# Patient Record
Sex: MALE
[De-identification: names, ages, dates, MRNs, and addresses within clinical notes are randomized per-mention and may not be internally consistent; named-entity substitution may affect disease eponyms.]

---

## 2017-06-16 ENCOUNTER — HOSPITAL ENCOUNTER (EMERGENCY)
Dept: HOSPITAL 42 - ED | Age: 57
Discharge: HOME | End: 2017-06-16
Payer: MEDICARE

## 2017-06-16 VITALS
TEMPERATURE: 98.7 F | OXYGEN SATURATION: 97 % | DIASTOLIC BLOOD PRESSURE: 73 MMHG | HEART RATE: 75 BPM | SYSTOLIC BLOOD PRESSURE: 126 MMHG

## 2017-06-16 VITALS — BODY MASS INDEX: 36.8 KG/M2

## 2017-06-16 VITALS — RESPIRATION RATE: 18 BRPM

## 2017-06-16 DIAGNOSIS — R10.9: ICD-10-CM

## 2017-06-16 DIAGNOSIS — N39.0: Primary | ICD-10-CM

## 2017-06-16 LAB
ADD MANUAL DIFF?: NO
ALBUMIN/GLOB SERPL: 1.3 {RATIO} (ref 1.1–1.8)
ALP SERPL-CCNC: 60 U/L (ref 38–133)
ALT SERPL-CCNC: 34 U/L (ref 7–56)
APPEARANCE UR: (no result)
APTT BLD: 28.4 SECONDS (ref 23.7–30.8)
AST SERPL-CCNC: 21 U/L (ref 15–59)
BASE EXCESS BLDV CALC-SCNC: 3.2 MMOL/L (ref 0–2)
BASOPHILS # BLD AUTO: 0.01 K/MM3 (ref 0–2)
BASOPHILS NFR BLD: 0.1 % (ref 0–3)
BILIRUB SERPL-MCNC: 1 MG/DL (ref 0.2–1.3)
BILIRUB UR-MCNC: NEGATIVE MG/DL
BUN SERPL-MCNC: 13 MG/DL (ref 7–21)
CALCIUM SERPL-MCNC: 9.5 MG/DL (ref 8.4–10.5)
CHLORIDE SERPL-SCNC: 101 MMOL/L (ref 98–107)
CO2 SERPL-SCNC: 26 MMOL/L (ref 21–33)
COLOR UR: YELLOW
EOSINOPHIL # BLD: 0.1 10*3/UL (ref 0–0.7)
EOSINOPHIL NFR BLD: 0.8 % (ref 1.5–5)
ERYTHROCYTE [DISTWIDTH] IN BLOOD BY AUTOMATED COUNT: 13.6 % (ref 11.5–14.5)
GLOBULIN SER-MCNC: 3 GM/DL
GLUCOSE SERPL-MCNC: 97 MG/DL (ref 70–110)
GLUCOSE UR STRIP-MCNC: NEGATIVE MG/DL
GRANULOCYTES # BLD: 9.33 10*3/UL (ref 1.4–6.5)
GRANULOCYTES NFR BLD: 80.3 % (ref 50–68)
HCT VFR BLD CALC: 36.9 % (ref 42–52)
INR PPP: 0.99 (ref 0.93–1.08)
KETONES UR STRIP-MCNC: NEGATIVE MG/DL
LEUKOCYTE ESTERASE UR-ACNC: (no result) LEU/UL
LYMPHOCYTES # BLD: 1.4 10*3/UL (ref 1.2–3.4)
LYMPHOCYTES NFR BLD AUTO: 11.9 % (ref 22–35)
MCH RBC QN AUTO: 30.8 PG (ref 25–35)
MCHC RBC AUTO-ENTMCNC: 34.7 G/DL (ref 31–37)
MCV RBC AUTO: 88.7 FL (ref 80–105)
MONOCYTES # BLD AUTO: 0.8 10*3/UL (ref 0.1–0.6)
MONOCYTES NFR BLD: 6.9 % (ref 1–6)
PH BLDV: 7.43 [PH] (ref 7.32–7.43)
PH UR STRIP: 6 [PH] (ref 4.7–8)
PLATELET # BLD: 194 10^3/UL (ref 120–450)
PMV BLD AUTO: 9.9 FL (ref 7–11)
POTASSIUM SERPL-SCNC: 4 MMOL/L (ref 3.6–5)
PROT SERPL-MCNC: 7 G/DL (ref 5.8–8.3)
PROT UR STRIP-MCNC: NEGATIVE MG/DL
RBC # UR STRIP: (no result) /UL
RBC #/AREA URNS HPF: (no result) /HPF (ref 0–2)
SODIUM SERPL-SCNC: 136 MMOL/L (ref 132–148)
SP GR UR STRIP: 1.01 (ref 1–1.03)
TROPONIN I SERPL-MCNC: < 0.01 NG/ML
UROBILINOGEN UR STRIP-ACNC: 0.2 E.U./DL
WBC # BLD AUTO: 11.6 10^3/UL (ref 4.5–11)

## 2017-06-16 PROCEDURE — 99283 EMERGENCY DEPT VISIT LOW MDM: CPT

## 2017-06-16 PROCEDURE — 71010: CPT

## 2017-06-16 PROCEDURE — 82803 BLOOD GASES ANY COMBINATION: CPT

## 2017-06-16 PROCEDURE — 85025 COMPLETE CBC W/AUTO DIFF WBC: CPT

## 2017-06-16 PROCEDURE — 85610 PROTHROMBIN TIME: CPT

## 2017-06-16 PROCEDURE — 82550 ASSAY OF CK (CPK): CPT

## 2017-06-16 PROCEDURE — 83615 LACTATE (LD) (LDH) ENZYME: CPT

## 2017-06-16 PROCEDURE — 80053 COMPREHEN METABOLIC PANEL: CPT

## 2017-06-16 PROCEDURE — 85730 THROMBOPLASTIN TIME PARTIAL: CPT

## 2017-06-16 PROCEDURE — 87086 URINE CULTURE/COLONY COUNT: CPT

## 2017-06-16 PROCEDURE — 93005 ELECTROCARDIOGRAM TRACING: CPT

## 2017-06-16 PROCEDURE — 74176 CT ABD & PELVIS W/O CONTRAST: CPT

## 2017-06-16 PROCEDURE — 81001 URINALYSIS AUTO W/SCOPE: CPT

## 2017-06-16 PROCEDURE — 84484 ASSAY OF TROPONIN QUANT: CPT

## 2017-06-16 NOTE — CT
PROCEDURE:  CT Abdomen and Pelvis without intravenous contrast



HISTORY:

left flank pain



COMPARISON:

None.



TECHNIQUE:

Without oral or IV contrast. 



Contrast Dose: 



Radiation dose:



Total exam DLP = 1127 mGy-cm.



This CT exam was performed using one or more of the following dose 

reduction techniques: Automated exposure control, adjustment of the 

mA and/or kV according to patient size, and/or use of iterative 

reconstruction technique.



FINDINGS:



LOWER THORAX:

Unremarkable. 



LIVER:

Unremarkable. No gross lesion or ductal dilatation.  



GALLBLADDER AND BILE DUCTS:

The gallbladder is contracted and contains a small stone. 



PANCREAS:

Unremarkable. No gross lesion or ductal dilatation.



SPLEEN:

Unremarkable. 



ADRENALS:

Unremarkable. No mass. 



KIDNEYS AND URETERS:

Unremarkable. No hydronephrosis. No solid mass. 



VASCULATURE:

Unremarkable. No aortic aneurysm. 



BOWEL:

Unremarkable. No obstruction. No gross mural thickening. Sutures are 

seen in the ascending colon.



APPENDIX:

Probably resected 



PERITONEUM:

Unremarkable. No free fluid. No free air. 



LYMPH NODES:

Unremarkable. No enlarged lymph nodes. 



BLADDER:

Unremarkable. 



REPRODUCTIVE:

Unremarkable. 



BONES:

No acute fracture. 



OTHER FINDINGS:

None.



IMPRESSION:

No acute intra-abdominal findings

## 2017-06-16 NOTE — RAD
PROCEDURE:  CHEST RADIOGRAPH, 1 VIEW



HISTORY:

chills



COMPARISON:

04/17/2016



FINDINGS:



LUNGS:

Clear.



PLEURA:

No pneumothorax or pleural fluid seen.



CARDIOVASCULAR:

Normal.



OSSEOUS STRUCTURES:

No significant abnormalities.



VISUALIZED UPPER ABDOMEN:

Normal.



OTHER FINDINGS:

None. 



IMPRESSION:

No active disease.

## 2017-06-16 NOTE — ED PDOC
Arrival/HPI





- General


Chief Complaint: Back Pain


Time Seen by Provider: 06/16/17 11:29


Historian: Patient





- History of Present Illness


Narrative History of Present Illness (Text): 





06/16/17 11:45


Patient is a 57 year old male whose past medical history includes gastritis and 

right hemicolectomy, who presents to the emergency department with left sided 

back pain, intermittent body aches, and chills/sweats for the past 3 days. 

Patient states 2 days ago he had some "blood in his underwear" and was 

evaluated by his urologist. He denies associated dysuria, testicular pain or 

back pain at the time. Currently patient denies chest pain or shortness of 

breath. 





Time/Duration: < week


Symptom Onset: Gradual


Symptom Course: Unchanged


Modifying Factors (Text): 


None 





Past Medical History





- Provider Review


Nursing Documentation Reviewed: Yes





- Past History


Past History: No Previous





- Infectious Disease


Hx of Infectious Diseases: None





- Tetanus Immunization


Tetanus Immunization: Unknown





- Past Medical History


Past Medical History: No Previous





- Cardiac


Hx Pacemaker: No





- Pulmonary


Hx Respiratory Disorders: No





- Neurological


HX Cerebrovascular Accident: Yes (200)





- HEENT


Hx HEENT Disorder: Yes


Hx Deafness: Yes





- Renal


Hx Pyelonephritis: Yes





- Endocrine/Metabolic


Hx Endocrine Disorders: No





- Hematological/Oncological


Hx Blood Transfusions: No





- Integumentary


Hx Dermatological Disorder: No





- Musculoskeletal/Rheumatological


Hx Musculoskeletal Disorders: Yes





- Gastrointestinal


Hx Gastrointestinal Disorders: Yes


Other/Comment: rectal bleed





- Genitourinary/Gynecological


Hx Genitourinary Disorders:  (ERECTILE DYSFUNCTION,PRIAPISM)





- Psychiatric


Hx Emotional Abuse: No


Hx Physical Abuse: No


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





- Past Surgical History


Past Surgical History: Non-Contributing





- Surgical History


Other/Comment: right colon 2011/fran cochlear implants





- Anesthesia


Hx Anesthesia Reactions: No


Hx Malignant Hyperthermia: No





- Suicidal Assessment


Feels Threatened In Home Enviroment: No





Family/Social History





- Physician Review


Nursing Documentation Reviewed: Yes


Family/Social History: Unknown Family HX


Smoking Status: vapping


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


Substance used: cocaine


Hx Substance Use Treatment: No





Allergies/Home Meds


Allergies/Adverse Reactions: 


Allergies





warfarin Allergy (Verified 07/14/16 08:46)


 RASH








Home Medications: 


 Home Meds











 Medication  Instructions  Recorded  Confirmed


 


ARIPiprazole [Abilify] 15 mg PO HS 04/17/16 06/16/17


 


Aspirin [Rich Aspirin] 81 mg PO DAILY 04/17/16 06/16/17


 


Clopidogrel [Plavix] 75 mg PO DAILY 04/17/16 06/16/17


 


Gabapentin [Neurontin] 300 mg PO BID 04/17/16 06/16/17


 


Sertraline [Zoloft] 100 mg PO DAILY 04/17/16 06/16/17


 


Trazodone HCl [Trazodone HCl] 300 mg PO HS 04/17/16 06/16/17


 


traMADol [Ultram] 50 mg PO Q4H PRN 04/17/16 06/16/17














Review of Systems





- Physician Review


All systems were reviewed & negative as marked: Yes





- Review of Systems


Constitutional: Other (Chills, Sweats)


Respiratory: absent: SOB


Cardiovascular: absent: Chest Pain


Genitourinary Male: Other (No testicular pain).  absent: Dysuria


Musculoskeletal: Arthralgias, Back Pain





Physical Exam





- Physical Exam


Narrative Physical Exam (Text): 





Head:  Atraumatic.  Normocephalic. 


Eyes:  PERRL.  EOMI.  Conjunctivae are not pale.


ENT:  Mucous membranes are moist and intact.  Oropharynx is clear and 

symmetric. 


Neck:  Supple.  Full ROM.  No JVD.  No lymphadenopathy.


Cardiovascular:  Regular rate.  Regular rhythm.  No murmurs, rubs, or gallops.  

Distal pulses are 2+ and symmetric.


Pulmonary/Chest:  No evidence of respiratory distress.  Clear to auscultation 

bilaterally.  No wheezing, rales or rhonchi.


Abdominal:  Soft and non-distended.  Diffuse but very mild lower abdominal 

tenderness.  No rebound, guarding, or rigidity.  No organomegaly.  Good bowel 

sounds. No incarcerated hernias.


Back:  No CVA tenderness.  No midline tenderness. Point tenderness to left 

lateral lower lumbar region without erythema or edema.


Genitourinary: no testicular edema or erythema, no penile bleeding


Extremities:  No edema.   No cyanosis.  No clubbing.  Full range of motion in 

all extremities.  No calf tenderness.


Skin:  Skin is warm and dry.  No petechiae.  No purpura. 


Neurological:  Alert, awake, and oriented to person, place, time, and 

situation.  Normal speech. Motor and sensory exam intact.


Psychiatric:  Good eye contact.  Normal interaction, affect, and behavior.


06/17/17 12:04





Vital Signs Reviewed: Yes


Vital Signs











  Temp Pulse Resp BP Pulse Ox


 


 06/16/17 13:35  98.7 F  75  18  126/73  97


 


 06/16/17 11:16  99 F  80  18  120/75  95











Temperature: Afebrile


Blood Pressure: Normal


Pulse: Regular


Respiratory Rate: Normal


Appearance: Positive for: Well-Appearing, Non-Toxic, Comfortable


Pain Distress: Mild


Mental Status: Positive for: Alert and Oriented X 3





Medical Decision Making


ED Course and Treatment: 


Differential Diagnosis included but are not limited to:  Renal colic vs 

pyelonephritis vs colitis vs lumbar strain vs. uti





Plan:


Will obtain CT of the Abdomen/Pelvis, labs, urinalysis, and administer IV 

fluids. 





Progress Notes: 


Patient's prior records reviewed.  Recently evaluated by urologist to 2 days ago

, states he did not have pain at that time.


Patient reportedly has had work up for c. diff with PMD, as he has had chronic 

diarrhea. He denies change in character or consistency of stools over the past 

several weeks, still reports "loose stools". Patient also with history of 

chronic abdominal pain by his history. Patient with only mild lower abdominal 

discomfort, no rebound or guarding. CT ordered to evaluate for renal or 

intestinal pathology.





CT Abdomen/Pelvis


Approver: Haile Shelton MD


IMPRESSION:


No acute intra-abdominal findings.





Patient on re-exam is comfortable, nontoxic appearing.


UA reviewed, and I discussed case with Dr. Cross. Given lower abdominal 

discomfort, cannot exclude colitis, after communication with PMD will place on 

antibiotics/Flagyl.





This treatment plan reviewed with patient and PMD, will place on Augmentin as 

there is drug interaction with quinolones on his current meds.


Patient comfortable, nontoxic appearing on re-evaluation.


06/17/17 12:06








- Lab Interpretations


Lab Results: 








 06/16/17 12:01 





 06/16/17 12:01 





 Lab Results





06/16/17 13:05: Urine Color Yellow, Urine Appearance Sl cloudy, Urine pH 6.0, 

Ur Specific Gravity 1.015, Urine Protein Negative, Urine Glucose (UA) Negative, 

Urine Ketones Negative, Urine Blood Trace-intact H, Urine Nitrate Negative, 

Urine Bilirubin Negative, Urine Urobilinogen 0.2, Ur Leukocyte Esterase Small H

, Urine RBC 0 - 2, Urine WBC 5 - 10


06/16/17 12:01: Sodium 136, Chloride 101, Potassium 4.0, Carbon Dioxide 26, 

Anion Gap 13, BUN 13, Creatinine 0.9, Est GFR (African Amer) > 60, Est GFR (Non-

Af Amer) > 60, Random Glucose 97, Calcium 9.5, Total Bilirubin 1.0, AST 21, ALT 

34, Alkaline Phosphatase 60, Lactate Dehydrogenase 280 L, Total Creatine Kinase 

80, Troponin I < 0.01  D, Total Protein 7.0, Albumin 4.0, Globulin 3.0, Albumin/

Globulin Ratio 1.3


06/16/17 12:01: pO2 116 H, VBG pH 7.43, VBG pCO2 42.0, VBG HCO3 27.9, VBG Total 

CO2 29.2 H, VBG O2 Sat (Calc) 99.4 H, VBG Base Excess 3.2 H, VBG Potassium 4.1, 

Sodium 135.0, Chloride 104.0, Glucose 98, Lactate 0.6 L, FiO2 21.0, Venous 

Blood Potassium 4.1


06/16/17 12:01: PT 10.7, INR 0.99, APTT 28.4


06/16/17 12:01: WBC 11.6 H D, RBC 4.16, Hgb 12.8 L, Hct 36.9 L, MCV 88.7, MCH 

30.8, MCHC 34.7, RDW 13.6, Plt Count 194, MPV 9.9, Gran % 80.3 H, Lymph % (Auto

) 11.9 L, Mono % (Auto) 6.9 H, Eos % (Auto) 0.8 L, Baso % (Auto) 0.1, Gran # 

9.33 H, Lymph # 1.4, Mono # 0.8 H, Eos # 0.1, Baso # 0.01











- RAD Interpretation


Radiology Orders: 








06/16/17 11:46


ABD & PELVIS W/O PO OR IV CONT [CT] Stat 


CHEST ONE VIEW [RAD] Stat 














- Medication Orders


Current Medication Orders: 











Discontinued Medications





Sodium Chloride (Sodium Chloride 0.9%)  1,000 mls @ 100 mls/hr IV .Q10H STVEO


   Last Admin: 06/16/17 11:59  Dose: 100 mls/hr











- Nilsibe Statement


The provider has reviewed the documentation as recorded by the Patricia Read





Provider Nilsibwanda Attestation:


All medical record entries made by the Nilsibe were at my direction and 

personally dictated by me. I have reviewed the chart and agree that the record 

accurately reflects my personal performance of the history, physical exam, 

medical decision making, and the department course for this patient. I have 

also personally directed, reviewed, and agree with the discharge instructions 

and disposition. 





Disposition/Present on Arrival





- Present on Arrival


Any Indicators Present on Arrival: No


History of DVT/PE: No


History of Uncontrolled Diabetes: No


Urinary Catheter: No


History of Decub. Ulcer: No


History Surgical Site Infection Following: None





- Disposition


Have Diagnosis and Disposition been Completed?: Yes


Diagnosis: 


 Flank pain, UTI (urinary tract infection), Abdominal pain





Disposition: HOME/ ROUTINE


Disposition Time: 13:30


Patient Plan: Discharge


Condition: GOOD


Discharge Instructions (ExitCare):  Urinary Tract Infection in Men (ED), Flank 

Pain (ED)


Additional Instructions: 


For any fevers, any abdominal pain, any urinary symptoms, any vomiting or 

diarrhea, any sweats, any lightheadedness or dizziness, any difficulty moving 

bowels, any persistent or worsening of any symptoms, get rechecked.





Take antibiotics as directed. If back pain returns or worsening get rechecked 

immediately.


Follow-up with Dr. Jaime in 2-3 days.











Please contact your doctor or call one of the physicians/clinics you have been 

referred to that are listed on the Patient Visit Information form that is 

included in your discharge packet. Bring any paperwork you were given at 

discharge with you along with any medications you are taking to your follow up 

visit. Our treatment cannot replace ongoing medical care by a primary care 

provider (PCP) outside of the emergency department.





Thank you for allowing the [facility] team to be part of your care today.








If you had an X-Ray or CT scan: A Radiologist will review the ED reading if any 

change in treatment is needed we will contact you.***





If you had a blood, urine, or wound culture: It will take several days for the 

results, if any change in treatment is needed we will contact you.***





Prescriptions: 


Amoxicillin/Clavulanate [Augmentin 875 MG-125 MG] 1 tab PO BID #14 tab


metroNIDAZOLE [Flagyl] 500 mg PO TID #21 tab


Referrals: 


Patrick Cross MD [Primary Care Provider] - Follow up with primary

## 2017-06-17 NOTE — CARD
--------------- APPROVED REPORT --------------





EKG Measurement

Heart [id number]

MI [id number]

[id number]

[id number]

[id number]



<Conclusion>

Normal sinus rhythm

Normal ECG

## 2017-07-03 ENCOUNTER — HOSPITAL ENCOUNTER (OUTPATIENT)
Dept: HOSPITAL 42 - OPSURG | Age: 57
Discharge: HOME | End: 2017-07-03
Attending: SURGERY
Payer: MEDICARE

## 2017-07-03 VITALS — BODY MASS INDEX: 36.8 KG/M2

## 2017-07-03 VITALS — SYSTOLIC BLOOD PRESSURE: 127 MMHG | HEART RATE: 75 BPM | DIASTOLIC BLOOD PRESSURE: 72 MMHG | TEMPERATURE: 97.6 F

## 2017-07-03 VITALS — OXYGEN SATURATION: 96 % | RESPIRATION RATE: 18 BRPM

## 2017-07-03 DIAGNOSIS — D23.5: Primary | ICD-10-CM

## 2017-07-03 DIAGNOSIS — L72.3: ICD-10-CM

## 2017-07-03 NOTE — PCM.SURG1
Surgeon's Initial Post Op Note





- Surgeon's Notes


Surgeon: Dr. Sotelo


Assistant: Dr. Coreas PGY2, Dr. Pike PGY3


Type of Anesthesia: Local


Pre-Operative Diagnosis: Sebaceous cyst of abdominal wall


Operative Findings: same


Post-Operative Diagnosis: same


Operation Performed: Excision of Sebaceous Cyst, abdominal wall


Specimen/Specimens Removed: sebaceous cyst


Estimated Blood Loss: EBL {In ML}: 5


Blood Products Given: N/A


Drains Used: No Drains


Post-Op Condition: Good


Date of Surgery/Procedure: 07/03/17


Time of Surgery/Procedure: 10:20

## 2017-07-24 ENCOUNTER — HOSPITAL ENCOUNTER (OUTPATIENT)
Dept: HOSPITAL 42 - ENDO | Age: 57
Discharge: HOME | End: 2017-07-24
Attending: INTERNAL MEDICINE
Payer: MEDICARE

## 2017-07-24 VITALS
TEMPERATURE: 97.6 F | HEART RATE: 57 BPM | RESPIRATION RATE: 16 BRPM | DIASTOLIC BLOOD PRESSURE: 66 MMHG | SYSTOLIC BLOOD PRESSURE: 124 MMHG | OXYGEN SATURATION: 94 %

## 2017-07-24 VITALS — BODY MASS INDEX: 36.8 KG/M2

## 2017-07-24 DIAGNOSIS — K64.8: ICD-10-CM

## 2017-07-24 DIAGNOSIS — I10: ICD-10-CM

## 2017-07-24 DIAGNOSIS — D12.5: ICD-10-CM

## 2017-07-24 DIAGNOSIS — F41.9: ICD-10-CM

## 2017-07-24 DIAGNOSIS — E78.5: ICD-10-CM

## 2017-07-24 DIAGNOSIS — K57.30: ICD-10-CM

## 2017-07-24 DIAGNOSIS — Z86.73: ICD-10-CM

## 2017-07-24 DIAGNOSIS — D12.2: Primary | ICD-10-CM

## 2017-07-24 DIAGNOSIS — K62.5: ICD-10-CM

## 2017-07-24 LAB
BASOPHILS # BLD AUTO: 0.01 K/MM3 (ref 0–2)
BASOPHILS NFR BLD: 0.1 % (ref 0–3)
EOSINOPHIL # BLD: 0.2 10*3/UL (ref 0–0.7)
EOSINOPHIL NFR BLD: 2 % (ref 1.5–5)
ERYTHROCYTE [DISTWIDTH] IN BLOOD BY AUTOMATED COUNT: 13.3 % (ref 11.5–14.5)
GRANULOCYTES # BLD: 5.31 10*3/UL (ref 1.4–6.5)
GRANULOCYTES NFR BLD: 70.4 % (ref 50–68)
HGB BLD-MCNC: 13.9 GM/DL (ref 14–18)
INR PPP: 1.04 (ref 0.93–1.08)
LYMPHOCYTES # BLD: 1.5 10*3/UL (ref 1.2–3.4)
LYMPHOCYTES NFR BLD AUTO: 20.2 % (ref 22–35)
MCH RBC QN AUTO: 30.5 PG (ref 25–35)
MCHC RBC AUTO-ENTMCNC: 35.1 G/DL (ref 31–37)
MCV RBC AUTO: 87 FL (ref 80–105)
MONOCYTES # BLD AUTO: 0.6 10*3/UL (ref 0.1–0.6)
MONOCYTES NFR BLD: 7.3 % (ref 1–6)
PLATELET # BLD: 186 10^3/UL (ref 120–450)
PMV BLD AUTO: 9.3 FL (ref 7–11)
PROTHROMBIN TIME: 11.2 SECONDS (ref 9.9–11.8)
RBC # BLD AUTO: 4.55 10^6/UL (ref 3.5–6.1)
WBC # BLD AUTO: 7.5 10^3/UL (ref 4.5–11)

## 2017-07-24 PROCEDURE — 45385 COLONOSCOPY W/LESION REMOVAL: CPT

## 2017-07-24 PROCEDURE — 88305 TISSUE EXAM BY PATHOLOGIST: CPT

## 2017-07-24 PROCEDURE — 45384 COLONOSCOPY W/LESION REMOVAL: CPT

## 2017-07-24 PROCEDURE — 36415 COLL VENOUS BLD VENIPUNCTURE: CPT

## 2017-07-24 PROCEDURE — 45380 COLONOSCOPY AND BIOPSY: CPT

## 2017-07-24 PROCEDURE — 85610 PROTHROMBIN TIME: CPT

## 2017-07-24 PROCEDURE — 85025 COMPLETE CBC W/AUTO DIFF WBC: CPT

## 2017-08-14 ENCOUNTER — HOSPITAL ENCOUNTER (OUTPATIENT)
Dept: HOSPITAL 42 - ENDO | Age: 57
Discharge: HOME | End: 2017-08-14
Attending: INTERNAL MEDICINE
Payer: MEDICARE

## 2017-08-14 VITALS
TEMPERATURE: 97.6 F | HEART RATE: 67 BPM | SYSTOLIC BLOOD PRESSURE: 141 MMHG | RESPIRATION RATE: 19 BRPM | DIASTOLIC BLOOD PRESSURE: 72 MMHG

## 2017-08-14 VITALS — OXYGEN SATURATION: 96 %

## 2017-08-14 VITALS — BODY MASS INDEX: 39.9 KG/M2

## 2017-08-14 DIAGNOSIS — E78.5: ICD-10-CM

## 2017-08-14 DIAGNOSIS — K21.0: Primary | ICD-10-CM

## 2017-08-14 DIAGNOSIS — K29.80: ICD-10-CM

## 2017-08-14 DIAGNOSIS — K44.9: ICD-10-CM

## 2017-08-14 DIAGNOSIS — K29.50: ICD-10-CM

## 2017-08-14 DIAGNOSIS — I10: ICD-10-CM

## 2017-08-14 DIAGNOSIS — K25.9: ICD-10-CM

## 2017-08-14 PROCEDURE — 43239 EGD BIOPSY SINGLE/MULTIPLE: CPT

## 2017-08-14 PROCEDURE — 88305 TISSUE EXAM BY PATHOLOGIST: CPT

## 2017-08-14 PROCEDURE — 88342 IMHCHEM/IMCYTCHM 1ST ANTB: CPT

## 2017-10-08 ENCOUNTER — HOSPITAL ENCOUNTER (EMERGENCY)
Dept: HOSPITAL 42 - ED | Age: 57
Discharge: HOME | End: 2017-10-08
Payer: MEDICARE

## 2017-10-08 VITALS
HEART RATE: 82 BPM | DIASTOLIC BLOOD PRESSURE: 82 MMHG | SYSTOLIC BLOOD PRESSURE: 130 MMHG | TEMPERATURE: 98.2 F | RESPIRATION RATE: 17 BRPM | OXYGEN SATURATION: 98 %

## 2017-10-08 VITALS — BODY MASS INDEX: 39.9 KG/M2

## 2017-10-08 DIAGNOSIS — M54.5: Primary | ICD-10-CM

## 2017-10-08 PROCEDURE — 99282 EMERGENCY DEPT VISIT SF MDM: CPT

## 2017-10-08 PROCEDURE — 96372 THER/PROPH/DIAG INJ SC/IM: CPT

## 2017-10-08 PROCEDURE — 72110 X-RAY EXAM L-2 SPINE 4/>VWS: CPT

## 2017-10-08 NOTE — RAD
PROCEDURE:  Radiographs of the Lumbar Spine.



HISTORY:

Back Pain. No history of recent/ related trauma provided







COMPARISON:

No prior.



FINDINGS:



BONES:

Normal alignment. No listhesis. No fracture.



DISC SPACES:

Multilevel degenerative changes, mild.  Preservation of 

intervertebral disc spaces.



OTHER FINDINGS:

None.



IMPRESSION:

No significant or acute  findings to account for/ related to the 

clinical presentation. Additional benign and/or incidental findings 

described above.



___________________________________________________________



Concordant results with the preliminary interpretation rendered by 

the emergency department physician

procedure.

## 2017-10-08 NOTE — ED PDOC
Arrival/HPI





- General


Chief Complaint: Back Pain


Time Seen by Provider: 10/08/17 13:03


Historian: Patient





- History of Present Illness


Time/Duration: Other (2 weeks)


Symptom Onset: Gradual


Symptom Course: Worsening


Quality: Aching, Pressure


Severity Level: Moderate


Activities at Onset: Rest


Associated Symptoms (Text): 





10/08/17 13:21


Patient complains of a 2 week history of lower back pain, especially on the 

right, with radiation into his right posterior lower extremity. No injury or 

trauma. No abdominal pain nausea vomiting or diarrhea. No genitourinary 

symptoms. No weakness. No numbness tingling or paresthesias. No history of low 

back pain.





Past Medical History





- Past History


Past History: No Previous





- Infectious Disease


Hx of Infectious Diseases: None





- Tetanus Immunization


Tetanus Immunization: Unknown





- Past Medical History


Past Medical History: No Previous





- Cardiac


Hx Pacemaker: No





- Pulmonary


Hx Respiratory Disorders: No





- Neurological


Hx Paralysis: No





- HEENT


Hx HEENT Disorder: Yes


Hx Deafness: Yes





- Renal


Hx Pyelonephritis: Yes





- Endocrine/Metabolic


Hx Endocrine Disorders: No





- Hematological/Oncological


Hx Blood Transfusions: No


Hx Blood Transfusion Reaction: No





- Integumentary


Hx Dermatological Disorder: No





- Musculoskeletal/Rheumatological


Hx Musculoskeletal Disorders: Yes





- Gastrointestinal


Hx Gastrointestinal Disorders: Yes


Other/Comment: rectal bleed





- Genitourinary/Gynecological


Hx Genitourinary Disorders:  (ERECTILE DYSFUNCTION,PRIAPISM)





- Psychiatric


Hx Emotional Abuse: No


Hx Physical Abuse: No


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





- Past Surgical History


Past Surgical History: Non-Contributing





- Surgical History


Other/Comment: right colon 2011/farn cochlear implants





- Anesthesia


Hx Anesthesia: Yes


Hx Anesthesia Reactions: No


Hx Malignant Hyperthermia: No





- Suicidal Assessment


Feels Threatened In Home Enviroment: No





Family/Social History





- Physician Review


Nursing Documentation Reviewed: Yes


Family/Social History: Unknown Family HX


Smoking Status: Former Smoker


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


Substance used: cocaine


Hx Substance Use Treatment: No





Allergies/Home Meds


Allergies/Adverse Reactions: 


Allergies





warfarin Allergy (Verified 08/07/17 09:58)


 RASH


esomeprazole [From Nexium] Adverse Reaction (Verified 10/08/17 13:11)


 PAIN








Home Medications: 


 Home Meds











 Medication  Instructions  Recorded  Confirmed


 


ARIPiprazole [Abilify] 15 mg PO HS 04/17/16 10/08/17


 


Aspirin [Cocke Aspirin] 81 mg PO DAILY 04/17/16 10/08/17


 


Clopidogrel [Plavix] 75 mg PO DAILY 04/17/16 10/08/17


 


Gabapentin [Neurontin] 300 mg PO BID 04/17/16 10/08/17


 


Sertraline [Zoloft] 100 mg PO DAILY 04/17/16 10/08/17


 


Trazodone HCl 300 mg PO HS 04/17/16 10/08/17


 


traMADol [Ultram] 50 mg PO Q4H PRN 04/17/16 10/08/17


 


Ergocalciferol (Vitamin D2) 2,000 iu PO QPM 07/18/17 10/08/17





[Vitamin D2]   


 


Pravastatin Sodium [Pravachol] 20 mg PO QPM 07/18/17 10/08/17


 


Dexlansoprazole [Dexilant] 60 mg PO DAILY 08/14/17 10/08/17














Review of Systems





- Physician Review


All systems were reviewed & negative as marked: Yes





- Review of Systems


Respiratory: Normal


Cardiovascular: Normal


Gastrointestinal: Normal


Genitourinary Male: Normal


Musculoskeletal: Back Pain.  absent: Neck Pain


Neurological: Normal





Physical Exam


Vital Signs











  Temp Pulse Resp BP Pulse Ox


 


 10/08/17 13:02  98.1 F  72  20  132/61  99











Temperature: Afebrile


Blood Pressure: Normal


Pulse: Regular


Respiratory Rate: Normal


Appearance: Positive for: Well-Appearing, Non-Toxic, Uncomfortable


Pain Distress: Mild


Mental Status: Positive for: Alert and Oriented X 3





- Systems Exam


Head: Present: Atraumatic, Normocephalic


Neck: Present: Normal Range of Motion.  No: MIDLINE TENDERNESS, Paraspinal 

Tenderness


Respiratory/Chest: Present: Clear to Auscultation, Good Air Exchange.  No: 

Respiratory Distress, Accessory Muscle Use


Cardiovascular: Present: Regular Rate and Rhythm, Normal S1, S2.  No: Murmurs


Abdomen: Present: Normal Bowel Sounds.  No: Tenderness, Distention, Peritoneal 

Signs, Rebound, Guarding


Back: Present: Normal Inspection, Paraspinal Tenderness (Mild right lumbar 

paraspinous tenderness with no spasm. Negative straight leg raising. No sciatic 

notch tenderness. No scoliosis. No swelling. No skin changes.).  No: CVA 

Tenderness, Midline Tenderness


Lower Extremity: Present: Normal Inspection, NORMAL PULSES, Normal ROM, 

Neurovascularly Intact.  No: Edema, Cyanosis, Oma's Sign, Tenderness, Swelling

, Erythema, Deformity


Neurological: Present: GCS=15, CN II-XII Intact, Speech Normal, Motor Func 

Grossly Intact, Gait Normal





Medical Decision Making


ED Course and Treatment: 





10/08/17 14:15


Patient just returned from x-ray. He has not yet received his Toradol and 

Flexeril. His x-rays are unrevealing. He will be discharged as soon as he 

receives his medication with prescriptions to follow-up with his PMD. Follow-up 

and the ER as needed.





- RAD Interpretation


Radiology Orders: 








10/08/17 13:20


LS SPINE WITH OBL > 18 YRS OLD [RAD] Stat 








Lumbosacral spine shows DJD with no fracture or dislocation


: ED Physician





- Medication Orders


Current Medication Orders: 








Cyclobenzaprine HCl (Flexeril)  10 mg PO ONCE STEVO


   Last Admin: 10/08/17 14:14  Dose: 10 mg





Discontinued Medications





Ketorolac Tromethamine (Toradol)  60 mg IM ONCE ONE


   Stop: 10/08/17 13:21


   Last Admin: 10/08/17 14:15  Dose: 60 mg





MAR Pain Assessment


 Document     10/08/17 14:15  IT  (Rec: 10/08/17 14:15  [facility]  [id number]


     Pain Reassessment


      Is this a pain reassessment?               No


     Sleep


      Is patient sleeping during reassessment?   No


     Presence of Pain


      Presence of Pain                           Yes


     Pain Scale Used


      Pain Scale Used                            Numeric


     Location


      Left, Right or Bilateral                   Bilateral


      Upper or Lower                             Lower


      Pain Location Body Site                    Generalized


IM Administration Charges


 Document     10/08/17 14:15  IT  (Rec: 10/08/17 14:15  IT  [facility] [id number]


     Charges for Administration


      # of IM Administrations                    1














Disposition/Present on Arrival





- Present on Arrival


Any Indicators Present on Arrival: No


History of DVT/PE: No


History of Uncontrolled Diabetes: No


Urinary Catheter: No


History of Decub. Ulcer: No


History Surgical Site Infection Following: None





- Disposition


Have Diagnosis and Disposition been Completed?: Yes


Diagnosis: 


 Low back pain





Disposition: HOME/ ROUTINE


Disposition Time: 14:17


Patient Plan: Discharge


Condition: GOOD


Discharge Instructions (ExitCare):  Acute Low Back Pain (ED)


Prescriptions: 


Cyclobenzaprine [Cyclobenzaprine HCl] 5 mg PO Q8 #15 tab


Cyclobenzaprine [Cyclobenzaprine HCl] 5 mg PO Q8 #15 tab


Forms:  [employer] (English)

## 2017-12-06 ENCOUNTER — HOSPITAL ENCOUNTER (OUTPATIENT)
Dept: HOSPITAL 42 - SDS | Age: 57
Discharge: HOME | End: 2017-12-06
Payer: MEDICARE

## 2017-12-06 VITALS — RESPIRATION RATE: 18 BRPM

## 2017-12-06 VITALS — SYSTOLIC BLOOD PRESSURE: 139 MMHG | DIASTOLIC BLOOD PRESSURE: 72 MMHG | OXYGEN SATURATION: 96 % | HEART RATE: 80 BPM

## 2017-12-06 VITALS — TEMPERATURE: 97.4 F

## 2017-12-06 VITALS — BODY MASS INDEX: 39.9 KG/M2

## 2017-12-06 DIAGNOSIS — N35.9: Primary | ICD-10-CM

## 2017-12-06 PROCEDURE — 52281 CYSTOSCOPY AND TREATMENT: CPT

## 2018-03-14 ENCOUNTER — HOSPITAL ENCOUNTER (OUTPATIENT)
Dept: HOSPITAL 42 - SDS | Age: 58
Discharge: HOME | End: 2018-03-14
Payer: MEDICARE

## 2018-03-14 VITALS — TEMPERATURE: 97.2 F

## 2018-03-14 VITALS
OXYGEN SATURATION: 96 % | SYSTOLIC BLOOD PRESSURE: 142 MMHG | DIASTOLIC BLOOD PRESSURE: 81 MMHG | HEART RATE: 71 BPM | RESPIRATION RATE: 20 BRPM

## 2018-03-14 VITALS — BODY MASS INDEX: 36.9 KG/M2

## 2018-03-14 DIAGNOSIS — M76.61: Primary | ICD-10-CM

## 2018-03-14 DIAGNOSIS — M70.871: ICD-10-CM

## 2018-03-14 PROCEDURE — 27605 INCISION OF ACHILLES TENDON: CPT

## 2018-03-14 PROCEDURE — 20551 NJX 1 TENDON ORIGIN/INSJ: CPT

## 2018-03-14 PROCEDURE — 76942 ECHO GUIDE FOR BIOPSY: CPT

## 2018-03-14 PROCEDURE — 11043 DBRDMT MUSC&/FSCA 1ST 20/<: CPT

## 2018-03-14 NOTE — PCM.SURG1
Surgeon's Initial Post Op Note





- Surgeon's Notes


Surgeon: Dr. Cline DPM


Assistant: Dr. Haider DPM


Type of Anesthesia: IV Sedation, Local


Anesthesia Administered By: Dr. Enamorado


Pre-Operative Diagnosis: right painful posterior heel, chronic achilles 

tendonpathy, and retrocalcaneal bursitis


Operative Findings: see dications; materials nylon 3-0, preoperative injectables

: 11 cc of 1:1 of 1% lidocaine plain and .5% marcaine plain; intraoperative 

injectables: 9 cc of .5 % marcaine plain; aminox injection


Post-Operative Diagnosis: same


Operation Performed: percutaneous tenotomy of the right achilles under 

ultrasound guidance


Specimen/Specimens Removed: none


Estimated Blood Loss: EBL {In ML}: 2


Blood Products Given: N/A


Drains Used: No Drains


Post-Op Condition: Good


Date of Surgery/Procedure: 03/14/18


Time of Surgery/Procedure: 07:30

## 2018-03-14 NOTE — CP.SDSHP
Same Day Surgery H & P





- History


Proposed Procedure: percutaneous tenotomy of the right achilles under 

ultrasound guidance


Pre-Op Diagnosis: right painful achilles tendinopathy and right retrocalcaneo 

buritis





- Allergies


Allergies: 


Allergies





warfarin Allergy (Verified 08/07/17 09:58)


 RASH


esomeprazole [From Nexium] Adverse Reaction (Verified 10/08/17 13:11)


 PAIN











- Physical Exam


Mental Status: Alert & Oriented x3





- {Optional Preform as Required}


Integument: WNL


Other Pertinent Findings: Pain with palpation of the right achilles tendon at 

the insertion,.  Severe pain at the posterior calcaneal of right heel.  Pain 

with right ankle ROM at the posterior achilles tendon





- Impression


Impression: Pt was seen and examined in SDS.  Pt NPO status was confirmed.  All 

Pre-op testing and clearance was in the chart.  Pt has exhausted all 

conservative treatment at this time and is opting for surgical intervention.  

Pt was explained procedure and post-operative course.  All pt's questions were 

answered to satisfaction.  No guarantees were made.  Pt understands all risks, 

benefits and complications of procedure.  Pt will follow-up with Dr. Cline





- Date & Time


Date: 03/14/18


Time: 07:30





Short Stay Discharge





- Short Stay Discharge


Admitting Diagnosis/Reason for Visit: M76.61/M24.571/M71.371


Disposition: HOME/ ROUTINE


Referrals: 


Patrick Cross MD [Primary Care Provider] - 


Follow-up: 





Patient to follow up with Dr. Cline in 1 week in the office


Additional Instructions (Diet, Activity): 


--Patient in good/stable condition for discharge home. Pt to resume medications 

per medical reconciliation. Resume regular diet. 


Please keep dressing clean, dry, & intact to surgical site, use plastic bag 

over bandage for


showering, wear CAM boot at all times when ambulating, call clinic if you see


signs of infection (redness, swelling, malodor), please make an


appointment to see Dr. Cline in office/clinic within 1 week for post-op 

check.


Progress Note/Discharge Note with Instructions: 





- Patient evaluated bedside in recovery s/p surgical procedure.


- After surgical procedure patient in NAD


- Capillary refill time <3s and NVSI intact.


- Patient denies complaints at this time


- Post operative instructions and plan of care explained to patient at length.


- Pt. acknowledges understanding.


- Patient stable for DC per podiatric surgery

## 2018-03-15 NOTE — OP
PROCEDURE DATE:  03/14/2018



PREOPERATIVE DIAGNOSES:  Right painful posterior heel, chronic Achilles

tendinopathy and retrocalcaneal bursitis.



POSTOPERATIVE DIAGNOSES:  Right painful posterior heel, chronic Achilles

tendinopathy and retrocalcaneal bursitis.



PROCEDURE:  Percutaneous tenotomy of the right Achilles tendon under

ultrasound guidance and amnio cells injection.



SURGEON:  Eileen Cline DPM



ASSISTANT:  Nic Haider DPM, PGY-1



TYPE OF ANESTHESIA:  IV sedation with local.



ANESTHESIA ADMINISTERED BY:  Dr. Sullivan



INDICATIONS:  The patient is a 57-year-old male with the above diagnosis. 

The patient has exhausted all conservative treatment at this time and now

requires surgical intervention.  The patient signed the consent after

careful explanation of risks, benefits, complications, and alternatives for

surgical procedure.  No guarantees were given nor implied.



PREPARATION:  The patient was brought into the operating room and placed on

the operating room table in a prone position.  A timeout was performed for

identification of the correct patient and procedure.  After induction of IV

sedation, the patient received a total of 11 mL of 1:1 of lidocaine plain

and 0.5 Marcaine plain in a local block fashion to the posterior right ankle.



DESCRIPTION OF PROCEDURE:  Once anesthesia was achieved, the right foot and

ankle was then prepped and draped in a normal sterile manner.  No

tourniquet was used during the entire procedure.  Attention was then

directed to the posterior right ankle. A sterile sleeve was placed over the

ultrasound transducer, and a diagnostic ultrasound was performed.  The

anomaly was identified in that the diseased thickened area of Achilles was

observed.  Using a #11 blade, a stab incision was made to the lateral

aspect of the posterior right ankle.  The CX two-hand piece was inserted

into the mid substance of Achilles tendon.  The hypoechoic regions of the

Achilles tendon were visualized with the ultrasound.  The foot pedal was

depressed, and the area was debrided and tissue excised.  Approximately a

total of 5 minutes of ultrasonic energy was delivered.  Then using a #11

blade, a second stab incision was made on the medial aspect of the

posterior right ankle.  The CX two-hand piece was inserted in the mid

substance of the Achilles tendon.  The hypoechoic regions of the Achilles

tendon were visualized with ultrasound.  The foot pedal was depressed, and

the area was debrided and tissue excised.  Approximately a total of 5

minutes of ultrasonic energy was delivered.  Next, the surgical site was

cleansed using a saline solution and flushed, then using a 3-0 nylon, the

skin and skin edges were reapproximately in a simple suture pattern to

both surgical incision sites.



Next, a total of 9 mL of 0.5 Marcaine plain was given in a local block

fashion to the right ankle.  Next amnio cells, about 1 ml, was injected 
surrounding the

achilles tendon at the level of insertion and surrounding achilles tendon

Next, the incision sites were then dressed with Adaptic, gauze, Amaya,

and kerlix



POSTOPERATIVE CONDITION:  The patient tolerated anesthesia and procedure

well and was escorted to the recovery room with the vital signs stable and

neurovascular status intact to the right foot.  Patient to remain

weightbearing as tolerated in a CAM boot.  This patient will be seen and

followed by Dr. Cline as an outpatient in her office this Monday.





__________________________________________

Nic Haider DPM





__________________________________________

Eileen Cline DPM





DD:  03/14/2018 22:39:34

DT:  03/15/2018 2:32:04

Job # [id number]







MAYRA

## 2018-06-26 ENCOUNTER — HOSPITAL ENCOUNTER (EMERGENCY)
Dept: HOSPITAL 42 - ED | Age: 58
Discharge: HOME | End: 2018-06-26
Payer: MEDICARE

## 2018-06-26 VITALS — RESPIRATION RATE: 18 BRPM

## 2018-06-26 VITALS — BODY MASS INDEX: 36.8 KG/M2

## 2018-06-26 VITALS
HEART RATE: 72 BPM | OXYGEN SATURATION: 98 % | DIASTOLIC BLOOD PRESSURE: 59 MMHG | SYSTOLIC BLOOD PRESSURE: 120 MMHG | TEMPERATURE: 98 F

## 2018-06-26 DIAGNOSIS — Z87.891: ICD-10-CM

## 2018-06-26 DIAGNOSIS — M25.562: Primary | ICD-10-CM

## 2018-06-26 NOTE — ED PDOC
Arrival/HPI





- General


Chief Complaint: Lower Extremity Problem/Injury


Time Seen by Provider: 06/26/18 11:05


Historian: Patient





- History of Present Illness


Narrative History of Present Illness (Text): 





06/26/18 11:58


This is a 57 yo M with PMH including TIA, CVA, bilateral carotid disease s/p 

stenting, urethral structures, and cochlear implants who presents with 

complaint of left knee pain x2 weeks, acutely worsened today.  Reports getting 

bilateral knee hydrocortisone shots approx 3 weeks prior by PMD (Dr. Cross) 

for chronic knee pain, and then approx 2 weeks ago, was turning left when heard 

a popping sound from left knee and developed new left knee pain.  Pain was 

stable  and did not impede him from performing activities of daily living until 

abruptly worsening today, so he called PMD's office and was instructed to 

present to ED.  Is unaware of any inciting incident that caused pain to worsen 

today, and denies falls or trauma to knee.  Reports sensation of swelling in 

knee, pain primarily along medial and posterior aspect of L knee, but denies 

any loss of sensation or motor control.  Does feel like knee is buckling today, 

but has not actually had leg give out from under him, just feels at greater 

risk of it today.  At rest in bed during interview, reports pain is 6/10, but 

appears comfortable.  Denies redness or sense of heat from knee; denies fevers, 

chills, shortness of breath, chest pain, nausea, emesis, dysuria, hematuria, or 

diarrhea.   Does report hx of "knee tear" discovered on MRI prior to cochlear 

implant, but unsure what knee or what was torn, and reports Ortho he followed 

up with at the time All other ROS in 12-system review negative.





PMH: as above


PSH: unspecified right colon surgery, bilateral cochlear implants


Fam Hx: denies


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 

vaping, former cigarette user (quit 3 years ago, estimates 20 yrs use, reports 

4.5 ppd)





PMD: Dr. Cross


Time/Duration: > week


Symptom Onset: Sudden (sudden worsening of knee pain)


Symptom Course: Other (unchanged from onset to today, then abruptly worsened)


Quality: Throbbing


Severity Level: 6 (at rest)


Context: Standing (occurred while turning)





Past Medical History





- Provider Review


Nursing Documentation Reviewed: Yes





- Past History


Past History: No Previous





- Infectious Disease


Hx of Infectious Diseases: None





- Tetanus Immunization


Tetanus Immunization: Unknown





- Past Medical History


Past Medical History: No Previous





- Cardiac


Hx Pacemaker: No





- Pulmonary


Hx Respiratory Disorders: No





- Neurological


Hx Paralysis: No





- HEENT


Hx HEENT Disorder: Yes


Hx Deafness: Yes (hearing aid)





- Renal


Hx Pyelonephritis: Yes





- Endocrine/Metabolic


Hx Endocrine Disorders: No





- Hematological/Oncological


Hx Blood Transfusions: No





- Integumentary


Hx Dermatological Disorder: No





- Musculoskeletal/Rheumatological


Hx Musculoskeletal Disorders: Yes





- Gastrointestinal


Hx Gastrointestinal Disorders: Yes


Other/Comment: rectal bleed





- Genitourinary/Gynecological


Hx Genitourinary Disorders:  (ERECTILE DYSFUNCTION,PRIAPISM)





- Psychiatric


Hx Emotional Abuse: No


Hx Physical Abuse: No


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





- Past Surgical History


Past Surgical History: Non-Contributing





- Surgical History


Other/Comment: right colon 2011/fran cochlear implants





- Anesthesia


Hx Anesthesia Reactions: No


Hx Malignant Hyperthermia: No





- Suicidal Assessment


Feels Threatened In Home Enviroment: No





Family/Social History





- Physician Review


Nursing Documentation Reviewed: Yes


Family/Social History: No Known Family HX


Smoking Status: Former Smoker


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


Substance used: cocaine


Hx Substance Use Treatment: No





Allergies/Home Meds


Allergies/Adverse Reactions: 


Allergies





warfarin Allergy (Verified 08/07/17 09:58)


 RASH


esomeprazole [From Nexium] Adverse Reaction (Verified 10/08/17 13:11)


 PAIN








Home Medications: 


 Home Meds











 Medication  Instructions  Recorded  Confirmed


 


ARIPiprazole [Abilify] 15 mg PO HS 04/17/16 03/14/18


 


Aspirin [Oconto Aspirin] 81 mg PO DAILY 04/17/16 03/14/18


 


Clopidogrel [Plavix] 75 mg PO DAILY 04/17/16 03/05/18


 


Gabapentin [Neurontin] 300 mg PO BID 04/17/16 03/14/18


 


Sertraline [Zoloft] 100 mg PO DAILY 04/17/16 03/14/18


 


Trazodone HCl 300 mg PO HS 04/17/16 03/14/18


 


traMADol [Ultram] 50 mg PO TID 04/17/16 03/14/18


 


Pravastatin Sodium [Pravachol] 20 mg PO QPM 07/18/17 03/14/18


 


Pantoprazole [Protonix EC Tab] 40 mg PO DAILY 03/05/18 03/14/18














Review of Systems





- Physician Review


All systems were reviewed & negative as marked: Yes (as per HPI)





- Review of Systems


Constitutional: Normal.  absent: Fatigue, Fevers, Night Sweats


Eyes: Normal.  absent: Vision Changes


ENT: Normal.  absent: Sore Throat, Rhinorrhea, Epistaxis, Sinus Congestion


Respiratory: Normal.  absent: SOB, Cough


Cardiovascular: Normal.  absent: Chest Pain, CAREY, Syncope


Gastrointestinal: Normal.  absent: Abdominal Pain, Constipation, Diarrhea, 

Nausea, Vomiting, Appetite Changes, Food Intolerance


Genitourinary Male: Normal.  absent: Dysuria, Frequency, Hematuria


Musculoskeletal: Joint Swelling (left knee swelling), Other (worsening left 

knee pain, left knee pop on onset).  absent: Normal


Skin: Normal.  absent: Rash, Pruritis, Cellulitis


Neurological: absent: Headache, Dizziness, Focal Weakness, Gait Changes


Endocrine: Normal.  absent: Diaphoresis





Physical Exam


Vital Signs











  Temp Pulse Resp BP Pulse Ox


 


 06/26/18 14:36  98.0 F  72  18  120/59 L  98


 


 06/26/18 10:42  98 F  78  18  123/66  97











Temperature: Afebrile


Blood Pressure: Normal


Pulse: Regular


Respiratory Rate: Normal


Appearance: Positive for: Well-Appearing, Non-Toxic, Other (mildly 

uncomfortable with utilization of knee, otherwise comfortable)


Pain Distress: Other (no overt pain at rest but pain reports 6/10 pain at rest, 

appears in mild discomfort with utilization and testing of left knee)


Mental Status: Positive for: Alert and Oriented X 3.  No: Confused, Agitated, 

Lethargic





- Systems Exam


Head: Present: Atraumatic, Normocephalic.  No: Tenderness, Contusion, Ecchymosis

, Abrasion, Laceration


Pupils: No: Pinpoint


Extroacular Muscles: Present: EOMI


Conjunctiva: Present: Normal.  No: Injected, Icteric


Mouth: Present: Moist Mucous Membranes, Normal Lips, Normal Tounge, Normal 

Teeth.  No: Dry, Drooling


Nose (External): Present: Atraumatic.  No: Abrasion, Laceration


Nose (Internal): Present: No Active Bleeding.  No: Epistaxis


Neck: Present: Normal Range of Motion, Trachea Midline.  No: MIDLINE TENDERNESS

, JVD


Respiratory/Chest: Present: Clear to Auscultation, Good Air Exchange.  No: 

Respiratory Distress, Accessory Muscle Use, Wheezes, Decreased Breath Sounds, 

Rales, Rhonchi, Tachypneic


Cardiovascular: Present: Regular Rate and Rhythm, Normal S1, S2, Peripheal 

Pulses Present (+2 radials and dorsalis pedis bilaterally).  No: Murmurs, 

Irregular Rhythm, Tachycardic, Bradycardic


Abdomen: Present: Normal Bowel Sounds.  No: Tenderness, Distention (obese but 

not distended), Guarding, Mass/Organomegaly


Upper Extremity: Present: Normal Inspection, Normal ROM, NORMAL PULSES.  No: 

Cyanosis, Edema, Tenderness, Swelling, Erythema, Deformity


Lower Extremity: Present: NORMAL PULSES, Normal ROM (some restriction of active 

ROM 2/2 pain, passive ROM remains intact fully), Tenderness (mild tenderness at 

medial aspect of pattella, chronic pain along posterior aspect of knee not 

worsened by palpation, worsened but still mild tenderness with valgus stress 

testing, no patellar tendon tenderness), Other (no appreciable laxity with 

valgus or varus testing, negative anterior and posterior drawer tests, negative 

Lachmann's, no hip tenderness).  No: Edema, CALF TENDERNESS, Cyanosis, Swelling

, Erythema, Deformity, Temperature Abnormalties


Neurological: Present: GCS=15, Speech Normal, Motor Func Grossly Intact, Normal 

Sensory Function


Skin: Present: Warm, Dry, Normal Color.  No: Rashes, Diaphoretic, Erythematous, 

Induration, Hot, Laceration, Abscess


Lymphatic: No: Cervical Adenopathy


Psychiatric: Present: Alert, Oriented x 3, Normal Insight, Normal Concentration

, Normal Affect, Normal Mood





Medical Decision Making


ED Course and Treatment: 





06/26/18 12:27


Ddx: patellar dislocation vs MCL injury vs ACL vs chondromalacia





Less likely MCL vs ACL full tear since ambulating without complaint for 2 weeks 

post-inciting insult


Given lack of systemic symptoms, no appreciable swelling or effusion on exam, 

no erythema, less likely septic joint, less likely cellulitis


B/l knee x-ray to assess, r/o fracture





06/26/18 14:42


X-ray negative for fracture, notable for bilateral tricompartmental OA


L knee immobilizer and Cane given, instructed in use


Case discussed with PMD, who requests patient be given referral to Orthopedist, 

Referral to Dr. Bautista placed in discharge papers.


Discharged to home





Seen, reviewed, and discussed with attending, Dr. Casanova





- RAD Interpretation


Radiology Orders: 








06/26/18 11:56


KNEE W PATELLA BILAT 3 VIEW [RAD] Stat 














- Medication Orders


Current Medication Orders: 











Discontinued Medications





Acetaminophen (Tylenol 325mg Tab)  975 mg PO STAT STA


   Stop: 06/26/18 11:57


   Last Admin: 06/26/18 12:10  Dose: 975 mg





MAR Pain/Vitals


 Document     06/26/18 12:10  OCS  (Rec: 06/26/18 12:11  OCS  [id number])


     Pain Reassessment


      Is This A Pain ReAssessment?               No


     Sleep


      Is patient sleeping during reassessment?   No


     Presence of Pain


      Presence of Pain                           Yes


     Pain Scale Used


      Pain Scale Used                            Numeric


     Location


      Left, Right or Bilateral                   Left


      Pain Location Body Site                    Knee


      Description                                Constant


      Intensity                                  6


      Scale Used                                 Numeric


      Aggravating Factors                        ADL's














Disposition/Present on Arrival





- Present on Arrival


Any Indicators Present on Arrival: No


History of DVT/PE: No


History of Uncontrolled Diabetes: No


Urinary Catheter: No


History Surgical Site Infection Following: None





- Disposition


Have Diagnosis and Disposition been Completed?: Yes


Diagnosis: 


 Left medial knee pain





Disposition: HOME/ ROUTINE


Disposition Time: 14:44


Patient Plan: Discharge


Condition: FAIR


Discharge Instructions (ExitCare):  Patellofemoral Pain (DC), Knee Pain (DC)


Additional Instructions: 


Please use your knee immobilizer and cane for gait stability until otherwise 

instructed by either Ortho or your PMD.





PEDRO BLAS, thank you for letting us take care of you today. Your provider 

was Aubrey Casanova MD and you were treated for (L)KNEE PAIN. The emergency 

medical care you received today was directed at your acute symptoms. If you 

were prescribed any medication, please fill it and take as directed. It may 

take several days for your symptoms to resolve. Return to the Emergency 

Department if your symptoms worsen, do not improve, or if you have any other 

problems.





Please follow up with your PMD within 1-2 weeks of discharge.  Additionally, at 

the instruction of your PMD, you have been given a referral for an Orthopedist, 

please call and make an appointment for follow-up. Bring any paperwork you were 

given at discharge with you along with any medications you are taking to your 

follow up visit. Our treatment cannot replace ongoing medical care by a primary 

care provider outside of the emergency department.





Thank you for allowing the [facility] team to be part of your care today.








If you had an X-Ray or CT scan: A Radiologist will review the ED reading if any 

change in treatment is needed we will contact you.***





If you had a blood, urine, or wound culture: It will take several days for the 

results, if any change in treatment is needed we will contact you.***





If you had an STI test: It will take 48 hours for the results. Please call 

after 1 week if you have not heard back.***


Referrals: 


Santiago Bautista MD [Staff Provider] - Follow up with primary


Patrick Cross MD [Family Provider] - Follow up with primary


Forms:  [employer] (English)

## 2018-06-26 NOTE — RAD
PROCEDURE:  Left Knee Radiographs.



HISTORY:

Pain.



COMPARISON:

None.



FINDINGS:



BONES:

Normal. No fracture. 



JOINTS:

Bilateral tricompartmental osteoarthritis. No articular erosion. 



JOINT EFFUSION:

Small left joint effusion. No right joint effusion. 



OTHER FINDINGS:

None.



IMPRESSION:

Bilateral tricompartmental osteoarthritis with small left joint 

effusion.

## 2018-10-18 ENCOUNTER — HOSPITAL ENCOUNTER (EMERGENCY)
Dept: HOSPITAL 42 - ED | Age: 58
Discharge: HOME | End: 2018-10-18
Payer: MEDICARE

## 2018-10-18 VITALS
RESPIRATION RATE: 18 BRPM | DIASTOLIC BLOOD PRESSURE: 88 MMHG | HEART RATE: 85 BPM | SYSTOLIC BLOOD PRESSURE: 128 MMHG | OXYGEN SATURATION: 100 %

## 2018-10-18 VITALS — TEMPERATURE: 97.8 F

## 2018-10-18 VITALS — BODY MASS INDEX: 36.8 KG/M2

## 2018-10-18 DIAGNOSIS — Z86.73: ICD-10-CM

## 2018-10-18 DIAGNOSIS — M25.532: ICD-10-CM

## 2018-10-18 DIAGNOSIS — Z87.891: ICD-10-CM

## 2018-10-18 DIAGNOSIS — M65.4: Primary | ICD-10-CM

## 2018-10-18 NOTE — RAD
Date of service: 



10/18/2018



PROCEDURE:  Left Wrist Radiographs.







HISTORY:

wrist pain, no trauma



COMPARISON:

None.



FINDINGS:



BONES:

Normal. No fracture. 



JOINTS:

Normal. No dislocation. 



SOFT TISSUES:

Normal. 



OTHER FINDINGS:

None.



IMPRESSION:

Normal left wrist radiographs.



___________________________________________________________



Concordant results with the preliminary interpretation rendered by 

the emergency department physician

procedure.

## 2018-10-18 NOTE — ED PDOC
Arrival/HPI





- General


Chief Complaint: Finger,Hand,&Wrist


Time Seen by Provider: 10/18/18 12:58


Historian: Patient





- History of Present Illness


Narrative History of Present Illness (Text): 





10/18/18 13:07


57 yo M with PMH including TIA, CVA, bilateral carotid disease s/p stenting, 

urethral structures, and cochlear implants who presents with complaint left 

wrist discomfort since 1 week. Patients states a sensation of "popping" in his 

left wrist. Pt denies any fall, trauma, or injury to hand. Patient denies any 

fever, chills, nausea, vomiting, diarrhea, abdominal pain, chest pain, shortness

of breath, cough, headache, dizziness, neck pain, back pain, or any other 

complaints.





PMD: Dr. Cross








Time/Duration: 1 week


Symptom Course: Unchanged


Quality: Aching


Activities at Onset: Light


Context: Home





Past Medical History





- Provider Review


Nursing Documentation Reviewed: Yes





- Past History


Past History: No Previous





- Infectious Disease


Hx of Infectious Diseases: None





- Tetanus Immunization


Tetanus Immunization: Unknown





- Past Medical History


Past Medical History: No Previous





- Cardiac


Hx Pacemaker: No





- Pulmonary


Hx Respiratory Disorders: No





- Neurological


Hx Paralysis: No





- HEENT


Hx HEENT Disorder: Yes


Hx Deafness: Yes (hearing aid)





- Renal


Hx Pyelonephritis: Yes





- Endocrine/Metabolic


Hx Endocrine Disorders: No





- Hematological/Oncological


Hx Blood Transfusions: No





- Integumentary


Hx Dermatological Disorder: No





- Musculoskeletal/Rheumatological


Hx Musculoskeletal Disorders: Yes





- Gastrointestinal


Hx Gastrointestinal Disorders: Yes


Other/Comment: rectal bleed





- Genitourinary/Gynecological


Hx Genitourinary Disorders:  (ERECTILE DYSFUNCTION,PRIAPISM)





- Psychiatric


Hx Emotional Abuse: No


Hx Physical Abuse: No


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





- Past Surgical History


Past Surgical History: Non-Contributing





- Surgical History


Other/Comment: right colon 2011/fran cochlear implants





- Anesthesia


Hx Anesthesia Reactions: No


Hx Malignant Hyperthermia: No





- Suicidal Assessment


Feels Threatened In Home Enviroment: No





Family/Social History





- Physician Review


Nursing Documentation Reviewed: Yes


Family/Social History: Unknown Family HX


Smoking Status: Former Smoker


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


Substance used: cocaine


Hx Substance Use Treatment: No





Allergies/Home Meds


Allergies/Adverse Reactions: 


Allergies





warfarin Allergy (Verified 10/18/18 12:51)


   RASH


esomeprazole [From Nexium] Adverse Reaction (Verified 10/18/18 12:51)


   PAIN








Home Medications: 


                                    Home Meds











 Medication  Instructions  Recorded  Confirmed


 


RX: ARIPiprazole [Abilify] 15 mg PO HS 04/17/16 10/18/18


 


RX: Aspirin [[place] Aspirin] 81 mg PO DAILY 04/17/16 10/18/18


 


RX: Clopidogrel [Plavix] 75 mg PO DAILY 04/17/16 10/18/18


 


RX: Gabapentin [Neurontin] 300 mg PO BID 04/17/16 10/18/18


 


RX: Sertraline [Zoloft] 100 mg PO DAILY 04/17/16 10/18/18


 


RX: Trazodone HCl 300 mg PO HS 04/17/16 10/18/18


 


RX: traMADol [Ultram] 50 mg PO TID 04/17/16 10/18/18


 


RX: Pravastatin Sodium [Pravachol] 20 mg PO QPM 07/18/17 10/18/18


 


RX: Pantoprazole [Protonix EC Tab] 40 mg PO DAILY 03/05/18 10/18/18














Review of Systems





- Physician Review


All systems were reviewed & negative as marked: Yes





- Review of Systems


Constitutional: absent: Fevers


Respiratory: absent: SOB, Cough


Cardiovascular: absent: Chest Pain


Gastrointestinal: absent: Abdominal Pain, Diarrhea, Nausea, Vomiting


Musculoskeletal: Other (left wrist pain).  absent: Back Pain, Neck Pain


Neurological: absent: Headache, Dizziness





Physical Exam


Vital Signs Reviewed: Yes





Vital Signs











  Temp Pulse Resp BP Pulse Ox


 


 10/18/18 12:52  97.8 F  88  17  172/76 H  98











Temperature: Afebrile


Blood Pressure: Hypertensive


Pulse: Regular


Respiratory Rate: Normal


Appearance: Positive for: Well-Appearing, Non-Toxic, Comfortable


Pain Distress: None


Mental Status: Positive for: Alert and Oriented X 3





- Systems Exam


Head: Present: Atraumatic, Normocephalic


Pupils: Present: PERRL


Extroacular Muscles: Present: EOMI


Conjunctiva: Present: Normal


Mouth: Present: Moist Mucous Membranes


Neck: Present: Normal Range of Motion


Respiratory/Chest: Present: Clear to Auscultation, Good Air Exchange.  No: 

Respiratory Distress, Accessory Muscle Use


Cardiovascular: Present: Regular Rate and Rhythm, Normal S1, S2.  No: Murmurs


Abdomen: No: Tenderness, Distention, Peritoneal Signs


Back: Present: Normal Inspection


Upper Extremity: Present: Normal ROM, NORMAL PULSES, Tenderness (lateral aspect 

of left wrist), Neurovascularly Intact, Capillary Refill < 2s.  No: Cyanosis, 

Edema


Lower Extremity: Present: Normal Inspection.  No: Edema


Neurological: Present: GCS=15, CN II-XII Intact, Speech Normal


Skin: Present: Warm, Dry, Normal Color.  No: Rashes


Psychiatric: Present: Alert, Oriented x 3, Normal Insight, Normal Concentration





Medical Decision Making


ED Course and Treatment: 





10/18/18 13:16


Impression: 58 year old male who presents to the Emergency Department  with 

complaints of left wrist pain. 





Differential Diagnosis included but are not limited to: fracture vs. tendinitis





Plan:


-- Naproxen


-- Left wrist xray


-- Reassess and disposition





Prior Visits:


Notes and results from previous visits were reviewed.





Progress Notes:








10/18/18 17:45


suspect dequarvain tenosynovitis vs other msk pain. no concern for infection 

normal rom. advise outpt fu. xc neg. 





- RAD Interpretation


Radiology Orders: 











10/18/18 13:05


WRIST, LEFT 3 VIEWS [RAD] Stat 














- Scribe Statement


The provider has reviewed the documentation as recorded by the Patricia mandel with [facility]





All medical record entries made by the Scribe were at my direction and 

personally dictated by me. I have reviewed the chart and agree that the record 

accurately reflects my personal performance of the history, physical exam, 

medical decision making, and the department course for this patient. I have also

 personally directed, reviewed, and agree with the discharge instructions and 

disposition.











Disposition/Present on Arrival





- Present on Arrival


Any Indicators Present on Arrival: No


History of DVT/PE: No


History of Uncontrolled Diabetes: No


Urinary Catheter: No


History of Decub. Ulcer: No


History Surgical Site Infection Following: None





- Disposition


Have Diagnosis and Disposition been Completed?: Yes


Diagnosis: 


 Tenosynovitis, de Quervain, Wrist pain





Disposition: HOME/ ROUTINE


Disposition Time: 02:00


Condition: STABLE


Discharge Instructions (ExitCare):  De Quervain's Tenosynovitis, Tendinopathy 

(DC)


Additional Instructions: 


follow up with specialist. return to er with worsening symptoms or concerns. 


Prescriptions: 


RX: Naproxen 500 mg PO BID PRN #14 tablet


 PRN Reason: Pain, Mild (1-3)


Referrals: 


Haile Bautista DO [Staff Provider] - Follow up with primary


Forms:  [employer] (English)

## 2019-03-29 ENCOUNTER — HOSPITAL ENCOUNTER (EMERGENCY)
Dept: HOSPITAL 42 - ED | Age: 59
Discharge: HOME | End: 2019-03-29
Payer: MEDICARE

## 2019-03-29 VITALS
DIASTOLIC BLOOD PRESSURE: 89 MMHG | SYSTOLIC BLOOD PRESSURE: 146 MMHG | RESPIRATION RATE: 16 BRPM | HEART RATE: 77 BPM | TEMPERATURE: 97.9 F

## 2019-03-29 VITALS — OXYGEN SATURATION: 95 %

## 2019-03-29 VITALS — BODY MASS INDEX: 36.8 KG/M2

## 2019-03-29 DIAGNOSIS — R10.9: Primary | ICD-10-CM

## 2019-03-29 LAB
APPEARANCE UR: CLEAR
BILIRUB UR-MCNC: NEGATIVE MG/DL
COLOR UR: YELLOW
GLUCOSE UR STRIP-MCNC: NEGATIVE MG/DL
LEUKOCYTE ESTERASE UR-ACNC: NEGATIVE LEU/UL
PH UR STRIP: 5.5 [PH] (ref 4.7–8)
PROT UR STRIP-MCNC: NEGATIVE MG/DL
RBC # UR STRIP: NEGATIVE /UL
SP GR UR STRIP: 1.02 (ref 1–1.03)
UROBILINOGEN UR STRIP-ACNC: 0.2 E.U./DL

## 2019-03-29 PROCEDURE — 74176 CT ABD & PELVIS W/O CONTRAST: CPT

## 2019-03-29 PROCEDURE — 81003 URINALYSIS AUTO W/O SCOPE: CPT

## 2019-03-29 PROCEDURE — 87086 URINE CULTURE/COLONY COUNT: CPT

## 2019-03-29 PROCEDURE — 96374 THER/PROPH/DIAG INJ IV PUSH: CPT

## 2019-03-29 PROCEDURE — 96361 HYDRATE IV INFUSION ADD-ON: CPT

## 2019-03-29 PROCEDURE — 99283 EMERGENCY DEPT VISIT LOW MDM: CPT

## 2019-03-29 NOTE — ED PDOC
Arrival/HPI





- General


Chief Complaint: Back Pain


Historian: Patient





- History of Present Illness


Narrative History of Present Illness (Text): 





03/29/19 07:49


58 year old male, with a past medical history including TIA, CVA, bilateral 

carotid disease s/p stenting, urethral structures, and cochlear implants, who 

presents to the emergency department complaining of left sided lower back pain, 

7/10 in severity, for 1 week. Patient reports seeing his PCP, who stated it was 

"muscular". Patient also reports speaking with his urologist, Dr. Benitez Byers, who referred patient to the emergency room. Patient endorses taking 

aspirin, tramadol, and muscle relaxers, with slight relief. Patient denies any 

fever, chills, headache, shortness of breath, nausea, vomiting, diarrhea, chest 

pain, difficulty urinating, or any other complaints. 











Time/Duration: 1 week


Symptom Onset: Gradual


Symptom Course: Unchanged


Activities at Onset: Light


Context: Home





Past Medical History





- Provider Review


Nursing Documentation Reviewed: Yes





- Past History


Past History: No Previous





- Infectious Disease


Hx of Infectious Diseases: None





- Tetanus Immunization


Tetanus Immunization: Unknown





- Past Medical History


Past Medical History: No Previous





- Cardiac


Hx Pacemaker: No





- Pulmonary


Hx Respiratory Disorders: No





- Neurological


Hx Paralysis: No





- HEENT


Hx HEENT Disorder: Yes


Hx Deafness: Yes (hearing aid)





- Renal


Hx Pyelonephritis: Yes





- Endocrine/Metabolic


Hx Endocrine Disorders: No





- Hematological/Oncological


Hx Blood Transfusions: No





- Integumentary


Hx Dermatological Disorder: No





- Musculoskeletal/Rheumatological


Hx Musculoskeletal Disorders: Yes





- Gastrointestinal


Hx Gastrointestinal Disorders: Yes


Hx Gastroesophageal Reflux: Yes


Other/Comment: rectal bleed





- Genitourinary/Gynecological


Hx Genitourinary Disorders:  (ERECTILE DYSFUNCTION,PRIAPISM)





- Psychiatric


Hx Anxiety: Yes


Hx Depression: Yes


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





- Past Surgical History


Past Surgical History: Non-Contributing





- Surgical History


Other/Comment: right colon 2011/fran cochlear implants





- Anesthesia


Hx Anesthesia Reactions: No


Hx Malignant Hyperthermia: No





- Suicidal Assessment


Feels Threatened In Home Enviroment: No





Family/Social History





- Physician Review


Nursing Documentation Reviewed: Yes


Family/Social History: Unknown Family HX


Smoking Status: Former Smoker


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


Substance used: cocaine


Hx Substance Use Treatment: No





Allergies/Home Meds


Allergies/Adverse Reactions: 


Allergies





warfarin Allergy (Verified 03/29/19 07:22)


   RASH


esomeprazole [From Nexium] Adverse Reaction (Verified 03/29/19 07:22)


   PAIN








Home Medications: 


                                    Home Meds











 Medication  Instructions  Recorded  Confirmed


 


ARIPiprazole [Abilify] 15 mg PO HS 04/17/16 03/29/19


 


Aspirin [[place] Aspirin] 81 mg PO DAILY 04/17/16 03/29/19


 


Clopidogrel [Plavix] 75 mg PO DAILY 04/17/16 03/29/19


 


Gabapentin [Neurontin] 300 mg PO BID 04/17/16 03/29/19


 


Sertraline [Zoloft] 100 mg PO DAILY 04/17/16 03/29/19


 


Trazodone HCl 300 mg PO HS 04/17/16 03/29/19


 


traMADol [Ultram] 50 mg PO TID 04/17/16 03/29/19


 


Pravastatin Sodium [Pravachol] 20 mg PO QPM 07/18/17 03/29/19


 


Pantoprazole [Protonix EC Tab] 40 mg PO DAILY 03/05/18 03/29/19














Review of Systems





- Physician Review


All systems were reviewed & negative as marked: Yes





- Review of Systems


Constitutional: absent: Fevers


Respiratory: absent: SOB, Cough


Cardiovascular: absent: Chest Pain


Gastrointestinal: absent: Abdominal Pain, Nausea, Vomiting


Musculoskeletal: Back Pain (left sided lower back pain)


Skin: absent: Rash


Neurological: absent: Headache, Dizziness


Endocrine: absent: Diaphoresis





Physical Exam


Vital Signs Reviewed: Yes





Vital Signs











  Temp Pulse Resp BP Pulse Ox


 


 03/29/19 07:06  98.1 F  94 H  18  134/76  93 L











Temperature: Afebrile


Blood Pressure: Normal


Pulse: Regular


Respiratory Rate: Normal


Appearance: Positive for: Well-Appearing, Non-Toxic, Comfortable


Pain Distress: None


Mental Status: Positive for: Alert and Oriented X 3





- Systems Exam


Head: Present: Atraumatic, Normocephalic


Pupils: Present: PERRL


Extroacular Muscles: Present: EOMI


Conjunctiva: Present: Normal


Mouth: Present: Moist Mucous Membranes


Neck: Present: Normal Range of Motion


Respiratory/Chest: Present: Clear to Auscultation, Good Air Exchange.  No: 

Respiratory Distress, Accessory Muscle Use


Cardiovascular: Present: Regular Rate and Rhythm, Normal S1, S2.  No: Murmurs


Abdomen: Present: Distention (slight distension).  No: Tenderness, Peritoneal 

Signs


Back: Present: Normal Inspection, Other (point tenderness noted to the left 

thoraic region.).  No: CVA Tenderness


Upper Extremity: Present: Normal Inspection.  No: Cyanosis, Edema


Lower Extremity: Present: Normal Inspection.  No: Edema


Neurological: Present: GCS=15, Speech Normal


Skin: Present: Warm, Dry, Normal Color.  No: Rashes


Psychiatric: Present: Alert, Oriented x 3, Normal Insight, Normal Concentration





Medical Decision Making


ED Course and Treatment: 





03/29/19 07:44


Impression:


58 year old male presents to the emergency department complaining of left sided 

lower back pain, 7/10 in severity, for 1 week.





Differential Diagnosis included but are not limited to:  


--Kidney Stone


--Pyeloephritis


--MSK pain








Plan:


-- Valium


-- Toradol


-- Ketalar


-- Urine culture


-- Urinalysis 


-- Reassess and disposition





Prior Visits:


Notes and results from previous visits were reviewed. 





Progress Notes:


03/29/19 10:12


CT a/p shows no evidence of hydronephrosis or kidney stones. Spoke to Dr. Byers who states patient may go home if no acute pathology is seen on CT a/p. 

Patient reevaluated reporting little alleviation in pain, however mentions he is

a recovering addict. Ketamine ordered.  











- Lab Interpretations


Lab Results: 








                                   Lab Results





03/29/19 08:15: Urine Color Yellow, Urine Appearance Clear, Urine pH 5.5, Ur 

Specific Gravity 1.025, Urine Protein Negative, Urine Glucose (UA) Negative, 

Urine Ketones Negative, Urine Blood Negative, Urine Nitrate Negative, Urine 

Bilirubin Negative, Urine Urobilinogen 0.2, Ur Leukocyte Esterase Negative








I have reviewed the lab results: Yes





- RAD Interpretation


Narrative RAD Interpretations (Text): 





03/29/19 10:19


CT a/p reviewed by radiologist, shows: No hydronephrosis or obstructing calculus

identified. The urinary bladder appears thick walled; recommend correlation with

urinalysis.








: Radiologist





- Medication Orders


Current Medication Orders: 





03/29/19 10:07














Discontinued Medications





Diazepam (Valium)  5 mg PO ONCE ONE; Protocol


   Stop: 03/29/19 07:52


   Last Admin: 03/29/19 08:08  Dose: 5 mg





Sodium Chloride (Sodium Chloride 0.9%)  1,000 mls @ 999 mls/hr IV .Q1H1M STA


   Stop: 03/29/19 09:39


   Last Admin: 03/29/19 09:27  Dose: 999 mls/hr





eMAR Start Stop


 Document     03/29/19 09:27  CD  (Rec: 03/29/19 09:28  CD  [id number])


     Intravenous Solution


      Start Date                                 03/29/19


      Start Time                                 09:28


      End Date                                   03/29/19


      End time                                   10:29


      Total Infusion Time                        61





Ketorolac Tromethamine (Toradol)  30 mg IVP STAT STA


   Stop: 03/29/19 07:50


   Last Admin: 03/29/19 08:09  Dose: 30 mg





MAR Pain Assessment


 Document     03/29/19 08:09  SRE  (Rec: 03/29/19 08:09  SRE  [id number])


     Pain Reassessment


      Is this a pain reassessment?               Yes


     Sleep


      Is patient sleeping during reassessment?   No


     Presence of Pain


      Presence of Pain                           Yes


     Location


      Left, Right or Bilateral                   Left


      Pain Location Body Site                    Back


     Description


      Description                                Intermittent


IVP Administration


 Document     03/29/19 08:09  SRE  (Rec: 03/29/19 08:09  SRE  [id number])


     Charges for Administration


      # of IVP Administrations                   1














- Scribe Statement


The provider has reviewed the documentation as recorded by the Nilsibwanda Escobar





All medical record entries made by the Nilsibwanda were at my direction and 

personally dictated by me. I have reviewed the chart and agree that the record 

accurately reflects my personal performance of the history, physical exam, 

medical decision making, and the department course for this patient. I have also

personally directed, reviewed, and agree with the discharge instructions and 

disposition.








Disposition/Present on Arrival





- Present on Arrival


Any Indicators Present on Arrival: No


History of DVT/PE: No


History of Uncontrolled Diabetes: No


Urinary Catheter: No


History of Decub. Ulcer: No


History Surgical Site Infection Following: None





- Disposition


Have Diagnosis and Disposition been Completed?: Yes


Diagnosis: 


 Flank pain





Disposition: HOME/ ROUTINE


Disposition Time: 10:57


Patient Plan: Discharge


Patient Problems: 


                             Current Active Problems











Problem Status Onset


 


Flank pain Acute 











Condition: STABLE


Discharge Instructions (ExitCare):  Nausea and Vomiting, Adult (DC)


Print Language: ENGLISH


Additional Instructions: 





All medical record entries made by the Scribe were at my direction and 

personally dictated by me. I have reviewed the chart and agree that the record 

accurately reflects my personal performance of the history, physical exam, 

medical decision making, and the department course for this patient. I have also

personally directed, reviewed, and agree with the discharge instructions and 

disposition.





Follow up with your PCP in 3-5 days


Prescriptions: 


Naproxen 500 mg PO Q6H #12 tab


Referrals: 


Benitez Byers MD [Staff Provider] - Follow up with primary


Forms:  [employer] (English)

## 2019-03-29 NOTE — CT
PROCEDURE:  CT Abdomen and Pelvis without Oral or IV contrast.



HISTORY:

renal colic...r/o kidney stones



COMPARISON:

None available 



TECHNIQUE:

Contiguous axial images of the abdomen and pelvis. No oral or IV 

contrast administered. Coronal and Sagittal reformats generated and 

reviewed. 



Radiation dose:



Total exam DLP = 944.93 mGy-cm.



This CT exam was performed using one or more of the following dose 

reduction techniques: Automated exposure control, adjustment of the 

mA and/or kV according to patient size, and/or use of iterative 

reconstruction technique.



FINDINGS:

There is limited evaluation of the solid organs without the 

administration of IV contrast.



LOWER THORAX:

No visible consolidation, pleural effusion, or pneumothorax.



LIVER:

Unremarkable unenhanced appearance. 



GALLBLADDER AND BILE DUCTS:

Unremarkable unenhanced appearance.



PANCREAS:

Unremarkable unenhanced appearance.



SPLEEN:

Unremarkable unenhanced appearance.



ADRENALS:

Unremarkable unenhanced appearance. 



KIDNEYS AND URETERS:

No hydronephrosis or obstructing renal calculus.



BLADDER:

Urinary bladder appears thick walled; recommend correlation with 

urinalysis.



REPRODUCTIVE:

The prostate gland measures approximately 3.6 x 3.6 cm and contains 

calcifications.



APPENDIX:

Appendix is not identified. Surgical clips near the cecum presumably 

due to appendectomy. No secondary signs of acute appendicitis. 



BOWEL:

The stomach is nondistended. 



Lack of oral contrast limits evaluation for bowel pathology.   The 

bowel loops appear within normal limits of caliber without evidence 

of intestinal obstruction.



PERITONEUM:

No significant free fluid. No definite free air.



LYMPH NODES:

No bulky lymphadenopathy identified.



VASCULATURE:

Atherosclerotic calcifications of the aorta. No aortic aneurysm. 



BONES:

Degenerative changes of the spine.



OTHER FINDINGS:

Small bilateral fat containing inguinal hernias. 



IMPRESSION:

No hydronephrosis or obstructing calculus identified. 



The urinary bladder appears thick walled; recommend correlation with 

urinalysis.